# Patient Record
Sex: MALE | Race: WHITE
[De-identification: names, ages, dates, MRNs, and addresses within clinical notes are randomized per-mention and may not be internally consistent; named-entity substitution may affect disease eponyms.]

---

## 2017-12-22 ENCOUNTER — HOSPITAL ENCOUNTER (OUTPATIENT)
Dept: HOSPITAL 35 - CAT | Age: 82
End: 2017-12-22
Attending: INTERNAL MEDICINE
Payer: COMMERCIAL

## 2017-12-22 DIAGNOSIS — C34.12: Primary | ICD-10-CM

## 2017-12-22 DIAGNOSIS — Z90.2: ICD-10-CM

## 2019-01-25 ENCOUNTER — HOSPITAL ENCOUNTER (OUTPATIENT)
Dept: HOSPITAL 35 - CV | Age: 84
End: 2019-01-25
Attending: INTERNAL MEDICINE
Payer: COMMERCIAL

## 2019-01-25 DIAGNOSIS — R06.09: ICD-10-CM

## 2019-01-25 DIAGNOSIS — I10: Primary | ICD-10-CM

## 2019-01-25 DIAGNOSIS — I48.0: ICD-10-CM

## 2019-01-28 NOTE — EXE
[facility]
[address]
[place]  [address]
Phone:  [phone]                    STRESS ECHOCARDIOGRAM         
_______________________________________________________________________________
 
Name:            ROSE SRINIVASAN               Room #:                    REG 
BRYAN#:           [id number]          Account #:     [id number]  
Admission:       01/25/19         Attend Phys:   Raffaele Carter,
Discharge:                  Date of Birth: [birth year]  
Date of Service: 01/28/19 0928               Report #:      [id number]
        [id number]
_______________________________________________________________________________
THIS REPORT FOR:   //name//                          
 
 
--------------- APPROVED REPORT --------------
 
 
Study performed:  01/25/2019 13:31:12
 
Exam:  Stress Echocardiogram
Indication: History of Afib
Patient Location: Out-Patient
Stress Nurse: Lu Boyer RN
Status: routine
 
Ht: 5 ft 6 in      
HR: 51 bpm       BP: 128/84 mmHg 
Rhythm: NSR    
 
Medical History
Medical History: Atrial Fibrillation
Medications: Listed on worksheet
Allergies: No known drug allergies
Cardiac Risk Factors: HTN, Hyperlipidemia
 
Procedure
The patient underwent an Exercise Stress Test using the Paulino Protocol. Blood
pressure, 
heart rate, and EKG were monitored.
An Echocardiogram was performed by technician in four stages in quad fashion. 
At peak 
stress, four selected images were obtained and placed side by side with resting
images 
for comparison.
 
Stress Test Details
Stress Test:  Exercise stress testing was performed using a Paulino 
protocol.      
HR           
Resting HR:            51 bpm   Max Heart Rate (APMHR): 134 bpm  
Max HR Achieved:  139 bpm   Target HR (85% APMHR): 113 bpm  
% of APMHR:         103         
Recovery HR:            61 bpm        
HR response to stress: Normal HR response to stress      
 
BP           
Resting BP:  128/84 mmHg        
 
 
[facility]
[address]
[place]  [address]
Phone:  [phone]                    STRESS ECHOCARDIOGRAM         
_______________________________________________________________________________
 
Name:            ROSE SRINIVASAN               Room #:                    REG CL
M.R.#:           [id number]          Account #:     [id number]  
Admission:       01/25/19         Attend Phys:   Raffaele Carter,
Discharge:                  Date of Birth: [birth year]  
Date of Service: 01/28/19 0928               Report #:      [id number]
        [id number]
_______________________________________________________________________________
Max BP:       190/102 mmHg        
Recovery BP:       128/80 mmHg        
BP response to stress: Normal blood pressure response to 
stress.      
ECG           
 
Clinical
Reason for Termination: moderate fatigue, protocol completed
Stress Symptoms: Dyspnea
Exercise duration: 5 min 22 sec
Exercise capacity: 7.20 METs
 
Pre-Stress Echo
The resting Echocardiogram showed normal left ventricular contractility with an
estimated 
Ejection Fraction of about 55%. 
Mild mitral and aortic regurgitation noted.
 
Post-Stress Echo
The stress Echocardiogram showed normal left ventricular contractility with an
estimated 
Ejection Fraction of about 60-65%. 
 
Conclusion
Clinical Response:  Non-ischemic
Exercise Capacity:  Average
Stress ECG Response:  Non-ischemic
Stress Echo Images:  Non-ischemic
 
Other Information
Study Quality: Adequate/lots of lung artifact pre and post
 
 
 
 
 
 
 
 
 
 
 
 
 
  <ELECTRONICALLY SIGNED>
   By: Raffaele Carter MD, FACC   
  01/28/19 0928
D: 01/28/19 0928                           _____________________________________
T: 01/28/19 0928                           Raffaele Carter MD, FACC     /INF

## 2019-07-05 ENCOUNTER — HOSPITAL ENCOUNTER (OUTPATIENT)
Dept: HOSPITAL 35 - GI | Age: 84
Discharge: HOME | End: 2019-07-05
Attending: SPECIALIST
Payer: COMMERCIAL

## 2019-07-05 VITALS — BODY MASS INDEX: 24.59 KG/M2 | HEIGHT: 64.02 IN | WEIGHT: 144 LBS

## 2019-07-05 DIAGNOSIS — Z79.899: ICD-10-CM

## 2019-07-05 DIAGNOSIS — K44.9: ICD-10-CM

## 2019-07-05 DIAGNOSIS — Z98.42: ICD-10-CM

## 2019-07-05 DIAGNOSIS — K21.0: ICD-10-CM

## 2019-07-05 DIAGNOSIS — Z98.890: ICD-10-CM

## 2019-07-05 DIAGNOSIS — E78.5: ICD-10-CM

## 2019-07-05 DIAGNOSIS — K31.9: ICD-10-CM

## 2019-07-05 DIAGNOSIS — K22.10: ICD-10-CM

## 2019-07-05 DIAGNOSIS — I10: ICD-10-CM

## 2019-07-05 DIAGNOSIS — K29.50: Primary | ICD-10-CM

## 2019-07-05 PROCEDURE — 62110: CPT

## 2019-07-05 PROCEDURE — 62900: CPT

## 2020-07-01 ENCOUNTER — HOSPITAL ENCOUNTER (OUTPATIENT)
Dept: HOSPITAL 35 - SJCVC | Age: 85
End: 2020-07-01
Attending: INTERNAL MEDICINE
Payer: COMMERCIAL

## 2020-07-01 DIAGNOSIS — M19.90: ICD-10-CM

## 2020-07-01 DIAGNOSIS — I48.0: Primary | ICD-10-CM

## 2020-07-01 DIAGNOSIS — I38: ICD-10-CM

## 2020-07-01 DIAGNOSIS — Z79.899: ICD-10-CM

## 2020-07-01 DIAGNOSIS — Z82.49: ICD-10-CM

## 2020-07-01 DIAGNOSIS — E78.5: ICD-10-CM

## 2020-07-01 DIAGNOSIS — E78.00: ICD-10-CM

## 2020-07-01 DIAGNOSIS — I10: ICD-10-CM

## 2020-11-10 ENCOUNTER — HOSPITAL ENCOUNTER (OUTPATIENT)
Dept: HOSPITAL 35 - SJCVCIMAG | Age: 85
End: 2020-11-10
Attending: INTERNAL MEDICINE
Payer: COMMERCIAL

## 2020-11-10 DIAGNOSIS — Z79.899: ICD-10-CM

## 2020-11-10 DIAGNOSIS — I08.2: Primary | ICD-10-CM

## 2020-11-10 DIAGNOSIS — R94.31: ICD-10-CM

## 2020-11-10 DIAGNOSIS — I48.0: ICD-10-CM

## 2020-11-10 DIAGNOSIS — I11.9: ICD-10-CM

## 2020-11-10 DIAGNOSIS — E78.00: ICD-10-CM

## 2020-11-10 DIAGNOSIS — I44.1: ICD-10-CM

## 2020-11-10 DIAGNOSIS — R00.0: ICD-10-CM

## 2021-06-03 ENCOUNTER — HOSPITAL ENCOUNTER (OUTPATIENT)
Dept: HOSPITAL 35 - SJCVC | Age: 86
End: 2021-06-03
Attending: INTERNAL MEDICINE
Payer: COMMERCIAL

## 2021-06-03 DIAGNOSIS — E78.5: ICD-10-CM

## 2021-06-03 DIAGNOSIS — Z79.899: ICD-10-CM

## 2021-06-03 DIAGNOSIS — I10: ICD-10-CM

## 2021-06-03 DIAGNOSIS — I49.1: Primary | ICD-10-CM

## 2021-06-03 DIAGNOSIS — I48.0: ICD-10-CM

## 2021-06-03 DIAGNOSIS — I77.810: ICD-10-CM

## 2021-06-03 DIAGNOSIS — E55.9: ICD-10-CM

## 2021-06-03 DIAGNOSIS — Z82.49: ICD-10-CM

## 2021-06-03 DIAGNOSIS — D41.4: ICD-10-CM

## 2021-06-03 DIAGNOSIS — E78.00: ICD-10-CM

## 2021-06-03 DIAGNOSIS — M19.90: ICD-10-CM

## 2021-06-04 ENCOUNTER — HOSPITAL ENCOUNTER (OUTPATIENT)
Dept: HOSPITAL 35 - SJCVCIMAG | Age: 86
End: 2021-06-04
Attending: INTERNAL MEDICINE
Payer: COMMERCIAL

## 2021-06-04 DIAGNOSIS — Z72.89: ICD-10-CM

## 2021-06-04 DIAGNOSIS — I48.0: ICD-10-CM

## 2021-06-04 DIAGNOSIS — E78.00: ICD-10-CM

## 2021-06-04 DIAGNOSIS — I35.1: Primary | ICD-10-CM

## 2021-06-04 DIAGNOSIS — I11.9: ICD-10-CM

## 2021-06-04 DIAGNOSIS — Z79.899: ICD-10-CM

## 2021-06-04 DIAGNOSIS — E78.5: ICD-10-CM

## 2022-01-17 ENCOUNTER — HOSPITAL ENCOUNTER (OUTPATIENT)
Dept: HOSPITAL 35 - SJCVC | Age: 87
End: 2022-01-17
Attending: INTERNAL MEDICINE
Payer: COMMERCIAL

## 2022-01-17 DIAGNOSIS — R00.2: ICD-10-CM

## 2022-01-17 DIAGNOSIS — M47.812: ICD-10-CM

## 2022-01-17 DIAGNOSIS — Z79.899: ICD-10-CM

## 2022-01-17 DIAGNOSIS — E78.00: ICD-10-CM

## 2022-01-17 DIAGNOSIS — Z72.89: ICD-10-CM

## 2022-01-17 DIAGNOSIS — E78.5: ICD-10-CM

## 2022-01-17 DIAGNOSIS — K21.9: ICD-10-CM

## 2022-01-17 DIAGNOSIS — I10: ICD-10-CM

## 2022-01-17 DIAGNOSIS — R94.31: Primary | ICD-10-CM

## 2022-01-17 DIAGNOSIS — M81.0: ICD-10-CM

## 2022-01-17 DIAGNOSIS — I48.0: ICD-10-CM

## 2022-01-17 DIAGNOSIS — I77.810: ICD-10-CM

## 2022-01-17 DIAGNOSIS — Z98.890: ICD-10-CM
